# Patient Record
Sex: FEMALE | URBAN - NONMETROPOLITAN AREA
[De-identification: names, ages, dates, MRNs, and addresses within clinical notes are randomized per-mention and may not be internally consistent; named-entity substitution may affect disease eponyms.]

---

## 2020-09-18 NOTE — MAMMOGRAPHY REPORT
BILATERAL DIGITAL SCREENING MAMMOGRAM: 9/10/2020

CLINICAL: Baseline exam.  

 

No prior exams were available for comparison.  The tissue of both breasts is heterogeneously dense. T
his may lower the sensitivity of mammography.  

 

There is a 1.1 cm irregular asymmetry with an indistinct margin in the right breast middle depth late
ral region seen on the craniocaudal view 7.5 cm from the nipple.  

 

There is a 0.7 cm irregular asymmetry with an indistinct margin in the left breast middle depth later
al region seen on the craniocaudal view 9.3 cm from the nipple.  

 

No other significant masses or calcifications are seen in either breast.  

 

IMPRESSION: INCOMPLETE: NEEDS ADDITIONAL IMAGING EVALUATION

The 1.1 cm irregular asymmetry in the right breast middle depth lateral region seen on the craniocaud
al view only is indeterminate.  A diagnostic mammogram with possible ultrasound are recommended.  

 

The 0.7 cm irregular asymmetry in the left breast middle depth lateral region seen on the craniocauda
l view only is indeterminate.  A diagnostic mammogram with possible ultrasound are recommended.  

 

 

This exam was interpreted at Station ID: [id number].  

 

NOTE: For mammograms, a report in lay terms will be sent to the patient. Approximately 15% of breast 
malignancies will not be visualized mammographically. In the management of a palpable breast mass, a 
negative mammogram must not discourage biopsy of a clinically suspicious lesion.

 

Electronically Signed By: Ryley Suggs M.D.          

jr/:9/17/2020 13:19:42  

 

 

 

ACR BI-RADS Category 0: Incomplete 3340F

PARENCHYMAL PATTERN: (D) - The breast(s) demonstrate(s) heterogeneously dense fibroglandular joan bravo.

BI-RADS CATEGORY: (0) - 0

Mammo and US

[id number]

Immediate follow-up

LATERALITY: (B)

## 2020-10-01 NOTE — MAMMOGRAPHY REPORT
BILATERAL DIGITAL DIAGNOSTIC MAMMOGRAM 3D/2D: 9/30/2020

CLINICAL: Patient returns today to evaluate asymmetries in bilateral breasts.  

 

Comparison is made to exam dated:  9/10/2020 mammogram - [facility].  The tissue of
 both breasts is heterogeneously dense. This may lower the sensitivity of mammography.  

 

The benign asymmetry in the right breast at 11 o'clock anterior depth is no longer seen.  

 

The benign asymmetry in the left breast middle depth central to the nipple seen on the craniocaudal v
iew only is no longer seen.  

 

No other significant masses or calcifications are seen in either breast.  

 

IMPRESSION: BENIGN

There is no mammographic evidence of malignancy. Return to annual mammogram screening schedule is rec
ommended.  

 

This exam was interpreted at Station ID: [id number].  

 

NOTE: For mammograms, a report in lay terms will be sent to the patient. Approximately 15% of breast 
malignancies will not be visualized mammographically. In the management of a palpable breast mass, a 
negative mammogram must not discourage biopsy of a clinically suspicious lesion.

 

Electronically Signed By: Ryley Suggs M.D.          

jr/:9/30/2020 11:52:52  

 

 

 

ACR BI-RADS Category 2: Benign Finding(s) 3342F

PARENCHYMAL PATTERN: (D) - The breast(s) demonstrate(s) heterogeneously dense fibroglandular joan bravo.

BI-RADS CATEGORY: (2) - 2

Mammogram

[id number]

return to screening

LATERALITY: (B)

## 2023-02-23 ENCOUNTER — APPOINTMENT (RX ONLY)
Dept: URBAN - NONMETROPOLITAN AREA CLINIC 12 | Facility: CLINIC | Age: 48
Setting detail: DERMATOLOGY
End: 2023-02-23

## 2023-02-23 DIAGNOSIS — L81.4 OTHER MELANIN HYPERPIGMENTATION: ICD-10-CM | Status: WORSENING

## 2023-02-23 DIAGNOSIS — Z71.89 OTHER SPECIFIED COUNSELING: ICD-10-CM

## 2023-02-23 PROCEDURE — ? OTHER

## 2023-02-23 PROCEDURE — ? COUNSELING

## 2023-02-23 PROCEDURE — 99202 OFFICE O/P NEW SF 15 MIN: CPT

## 2023-02-23 ASSESSMENT — LOCATION ZONE DERM
LOCATION ZONE: HAND
LOCATION ZONE: FACE

## 2023-02-23 ASSESSMENT — LOCATION DETAILED DESCRIPTION DERM
LOCATION DETAILED: LEFT LATERAL EYEBROW
LOCATION DETAILED: RIGHT ULNAR DORSAL HAND

## 2023-02-23 ASSESSMENT — LOCATION SIMPLE DESCRIPTION DERM
LOCATION SIMPLE: LEFT EYEBROW
LOCATION SIMPLE: RIGHT HAND

## 2023-02-23 NOTE — PROCEDURE: OTHER
Detail Level: Detailed
Render Risk Assessment In Note?: yes
Other (Free Text): Appears benign, discussed option of biopsy as she is concerned due to her personal hex of breast ca x 2. Opted to monitor at this time.
Note Text (......Xxx Chief Complaint.): This diagnosis correlates with the

## 2023-02-23 NOTE — HPI: SKIN LESION
Is This A New Presentation, Or A Follow-Up?: Growth
What Type Of Note Output Would You Prefer (Optional)?: Bullet Format
How Severe Is Your Skin Lesion?: mild
Has Your Skin Lesion Been Treated?: not been treated
Which Family Member (Optional)?: Brother had BCC